# Patient Record
Sex: FEMALE
[De-identification: names, ages, dates, MRNs, and addresses within clinical notes are randomized per-mention and may not be internally consistent; named-entity substitution may affect disease eponyms.]

---

## 2023-04-02 ENCOUNTER — NURSE TRIAGE (OUTPATIENT)
Dept: OTHER | Facility: CLINIC | Age: 53
End: 2023-04-02

## 2023-04-02 NOTE — TELEPHONE ENCOUNTER
Location of patient: [place]    Subjective: Caller states \"had foot surgery a week ago Friday\"     Current Symptoms: pain medication has constipated her from her foot surgery that she had over a week ago. Has not had bowel movement since before surgery. Pharmacist told her to try magnesium citrate. Still unable to have bowel movement after taking medication. Doesn't recall last time she urinated. Thinks it may have been sometime this morning. Stomach feels bloated. Feels pressure in rectum. Stopped taking pain medication because of constipation but now having a lot of pain. Feels nauseated. At times while straining felt dizzy. Onset: 1 week ago; worsening    Associated Symptoms: constipation    Pain Severity: 6/10; pressure; constant    Temperature: n/a     What has been tried: magnesium citrate    LMP: NA Pregnant: NA    Recommended disposition: See PCP within 24 Hours    Care advice provided, patient verbalizes understanding; denies any other questions or concerns; instructed to call back for any new or worsening symptoms. Patient/caller agrees to follow-up with PCP  or try urgent care if discomfort persists. This triage is a result of a call to [address] [place] [address]. Please do not respond to the triage nurse through this encounter. Any subsequent communication should be directly with the patient.       Reason for Disposition   Abdomen is more swollen than usual    Protocols used: Constipation-ADULT-AH